# Patient Record
Sex: FEMALE | Race: WHITE
[De-identification: names, ages, dates, MRNs, and addresses within clinical notes are randomized per-mention and may not be internally consistent; named-entity substitution may affect disease eponyms.]

---

## 2018-04-30 ENCOUNTER — HOSPITAL ENCOUNTER (OUTPATIENT)
Dept: HOSPITAL 89 - LAB | Age: 23
End: 2018-04-30
Attending: FAMILY MEDICINE
Payer: COMMERCIAL

## 2018-04-30 ENCOUNTER — HOSPITAL ENCOUNTER (EMERGENCY)
Dept: HOSPITAL 89 - ER | Age: 23
Discharge: HOME | End: 2018-04-30
Payer: COMMERCIAL

## 2018-04-30 VITALS — SYSTOLIC BLOOD PRESSURE: 103 MMHG | DIASTOLIC BLOOD PRESSURE: 75 MMHG

## 2018-04-30 DIAGNOSIS — R10.84: ICD-10-CM

## 2018-04-30 DIAGNOSIS — R11.10: ICD-10-CM

## 2018-04-30 DIAGNOSIS — R19.7: Primary | ICD-10-CM

## 2018-04-30 LAB
INR PPP: 0.97
PLATELET COUNT, AUTOMATED: 294 K/UL (ref 150–450)

## 2018-04-30 PROCEDURE — 82374 ASSAY BLOOD CARBON DIOXIDE: CPT

## 2018-04-30 PROCEDURE — 87045 FECES CULTURE AEROBIC BACT: CPT

## 2018-04-30 PROCEDURE — 99284 EMERGENCY DEPT VISIT MOD MDM: CPT

## 2018-04-30 PROCEDURE — 87177 OVA AND PARASITES SMEARS: CPT

## 2018-04-30 PROCEDURE — 96361 HYDRATE IV INFUSION ADD-ON: CPT

## 2018-04-30 PROCEDURE — 85651 RBC SED RATE NONAUTOMATED: CPT

## 2018-04-30 PROCEDURE — 84075 ASSAY ALKALINE PHOSPHATASE: CPT

## 2018-04-30 PROCEDURE — 82040 ASSAY OF SERUM ALBUMIN: CPT

## 2018-04-30 PROCEDURE — 74177 CT ABD & PELVIS W/CONTRAST: CPT

## 2018-04-30 PROCEDURE — 84132 ASSAY OF SERUM POTASSIUM: CPT

## 2018-04-30 PROCEDURE — 82310 ASSAY OF CALCIUM: CPT

## 2018-04-30 PROCEDURE — 86140 C-REACTIVE PROTEIN: CPT

## 2018-04-30 PROCEDURE — 84295 ASSAY OF SERUM SODIUM: CPT

## 2018-04-30 PROCEDURE — 82435 ASSAY OF BLOOD CHLORIDE: CPT

## 2018-04-30 PROCEDURE — 85730 THROMBOPLASTIN TIME PARTIAL: CPT

## 2018-04-30 PROCEDURE — 85025 COMPLETE CBC W/AUTO DIFF WBC: CPT

## 2018-04-30 PROCEDURE — 83690 ASSAY OF LIPASE: CPT

## 2018-04-30 PROCEDURE — 85610 PROTHROMBIN TIME: CPT

## 2018-04-30 PROCEDURE — 82565 ASSAY OF CREATININE: CPT

## 2018-04-30 PROCEDURE — 82247 BILIRUBIN TOTAL: CPT

## 2018-04-30 PROCEDURE — 96375 TX/PRO/DX INJ NEW DRUG ADDON: CPT

## 2018-04-30 PROCEDURE — 87449 NOS EACH ORGANISM AG IA: CPT

## 2018-04-30 PROCEDURE — 83630 LACTOFERRIN FECAL (QUAL): CPT

## 2018-04-30 PROCEDURE — 84703 CHORIONIC GONADOTROPIN ASSAY: CPT

## 2018-04-30 PROCEDURE — 84460 ALANINE AMINO (ALT) (SGPT): CPT

## 2018-04-30 PROCEDURE — 84520 ASSAY OF UREA NITROGEN: CPT

## 2018-04-30 PROCEDURE — 96376 TX/PRO/DX INJ SAME DRUG ADON: CPT

## 2018-04-30 PROCEDURE — 82150 ASSAY OF AMYLASE: CPT

## 2018-04-30 PROCEDURE — 81001 URINALYSIS AUTO W/SCOPE: CPT

## 2018-04-30 PROCEDURE — 96374 THER/PROPH/DIAG INJ IV PUSH: CPT

## 2018-04-30 PROCEDURE — 84155 ASSAY OF PROTEIN SERUM: CPT

## 2018-04-30 PROCEDURE — 82947 ASSAY GLUCOSE BLOOD QUANT: CPT

## 2018-04-30 PROCEDURE — 84450 TRANSFERASE (AST) (SGOT): CPT

## 2018-04-30 PROCEDURE — 87324 CLOSTRIDIUM AG IA: CPT

## 2018-04-30 NOTE — RADIOLOGY IMAGING REPORT
FACILITY: [facility] 

 

PATIENT NAME: Catalina Lyons

: 1995

MR: [id number]

V: [id number]

EXAM DATE: 

ORDERING PHYSICIAN: CHARLIE SALAZAR

TECHNOLOGIST: 

 

Location: [facility]

Patient: Catalina Lyons

: 1995

MRN: [id number]

Visit/Account:[id number]

Date of Sevice:  2018

 

ACCESSION #: [id number]

 

Computed tomograpy abdomen and pelvis with IV contrast

 

Indication: Abdominal pain. Leukocytosis.

 

Comparison:   None available. .

 

Technique:   Transaxial computed tomography images were obtained through the abdomen and pelvis follo
wing the injection of nonionic iodinated intravenous contrast. Reformatted coronal and sagittal image
s were also obtained.

 

One of the following dose optimization techniques was utilized in the performance of this exam: Autom
ated exposure control; adjustment of the mA and/or kV according to the patient's size; or use of an i
terative  reconstruction technique.  Specific details can be referenced in the facility's radiology C
T exam operational policy.

 

Contrast:   75 ml of Isovue-370  IV contrast.

 

Findings:

Lower lung fields: Limited views lower lung field are unremarkable.

 

Liver: There is mild fatty liver. No focal parenchymal abnormality is seen.

Biliary: There has been previous cholecystectomy. No intrahepatic or extrahepatic biliary dilatation.


Pancreas: Normal appearance.

Spleen: Normal appearance.

Adrenal glands: Unremarkable.

Kidneys / retroperitoneum: No evidence of nephrolithiasis or hydronephrosis

 

 

Bowel / peritoneum / mesenteries: The majority of the colon is filled with liquid stool. No definitiv
e wall thickening or pericolonic inflammation. Correlate for diarrhea. Appendix not clearly seen. No 
evidence to suggest acute appendicitis. The mid and distal small bowel loops are normal in caliber an
d majority are fluid-filled. This could be related to the liquid stool in the colon. No air-fluid lev
els. No free air. There is trace free pelvic fluid to the right of midline.

 

 

Lymph node assessment: No pathologic adenopathy identified.

 

Pelvic  structures: Appear unremarkable.

 

 

Vessels: No significant atherosclerotic calcifications seen throughout a nonaneurysmal abdominal aort
a and branches.

 

Musculoskeletal / Body wall: No acute or aggressive osseous abnormality.

 

 

 

IMPRESSION:

1. Liquid stool within the majority of the colon without definitive wall thickening or pericolonic in
flammation. Correlate for diarrhea. This may be related to fluid-filled small bowel loops which are n
ormal in caliber.

2. Appendix not clearly identified. No evidence to suggest acute appendicitis.

3. Trace free fluid within the low pelvis.

4. Mild fatty liver.

 

 

Report Dictated By: Albert Carolina at 2018 7:04 AM

 

Report E-Signed By: Albert Carolina  at 2018 7:13 AM

 

WSN:[id number]

## 2018-04-30 NOTE — ER REPORT
History and Physical


Time Seen By MD:  04:48


 (CHARLIE FONTENOT DO)


HPI/ROS


CHIEF COMPLAINT: Vomiting and diarrhea





HISTORY OF PRESENT ILLNESS: 22-year-old female presents with vomiting and 

diarrhea dysuria since 8 PM last night.  She was planning to come in earlier, 

but she was unable to get off of the toilet and come in.  She's been having 

bloody diarrhea.  She had a sinus infection several weeks ago and was 

prescribed antibiotics.  She denies consumption of bad food or exposure to ill 

contacts.  Patient reports diarrhea for several weeks, which came much worse 

with bloody diarrhea yesterday.  Patient notes no fever or chills.  She denies 

dysuria.  Her last menstrual.  It was 5 weeks ago.  She is on birth control.  

Patient reports strong family history of ulcerative colitis and Crohn's 

disease.  Patient is status post appendectomy and cholecystectomy





REVIEW OF SYSTEMS:


Respiratory: No cough, no dyspnea.


Cardiovascular: No chest pain, no palpitations.


Gastrointestinal: As above


Musculoskeletal: No back pain.


 (CHARLIE FONTENOT DO)


Allergies:  


Coded Allergies:  


     albuterol (Verified  Allergy, Intermediate, HIVES/VOMITING, 4/30/18)


     amoxicillin (Verified  Allergy, Intermediate, HIVES/VOMITING, 4/30/18)


     clavulanic acid (Verified  Allergy, Intermediate, HIVES/VOMITING, 4/30/18)


Home Meds


Active Scripts


Hydrocodone Bit/Acetaminophen (HYDROCODON-ACETAMINOPHEN 5-325) 1 Each Tablet, 1 

EACH PO Q4H Y for PAIN, #10 TAB 0 Refills


   Prov:FRANKLIN URBINA MD         4/30/18


Ondansetron (ZOFRAN ODT) 4 Mg Tab.rapdis, 4 MG PO Q6H Y for NAUSEA/VOMITING, #

20 TAB.SOL 0 Refills


   Prov:FRANKLIN URBINA MD         4/30/18


Fluconazole (DIFLUCAN) 150 Mg Tablet, 150 MG PO QDAY for 1 Day, #1 TAB 0 Refills


   Prov:FRANKLIN URBINA MD         4/30/18


Prednisone (PREDNISONE) 20 Mg Tablet, 40 MG PO QDAY for 5 Days, #10 TAB 0 

Refills


   Prov:FRANKLIN URBINA MD         4/30/18


Metronidazole (FLAGYL) 500 Mg Tablet, 500 MG PO TID, #30 TAB 0 Refills


   Prov:FRANKLIN URBINA MD         4/30/18


Ciprofloxacin Hcl (CIPROFLOXACIN HCL) 500 Mg Tablet, 500 MG PO Q12H, #20 TAB


   Prov:FRANKLIN URBINA MD         4/30/18


Reported Medications


[Birth Control]   No Conflict Check, 1 TAB PO QDAY


   4/30/18


Reviewed Nurses Notes:  Yes


Old Medical Records Reviewed:  Yes


 (MARTINCHARLIE LARY SANDERS)


Constitutional





Vital Sign - Last 24 Hours








 4/30/18 4/30/18 4/30/18 4/30/18





 04:49 04:59 05:08 05:14


 


Temp 98.1   


 


Pulse 137 117  112


 


Resp 24   


 


B/P (MAP) 123/100  124/81 (95) 


 


Pulse Ox 97 95  99


 


    





 4/30/18 4/30/18 4/30/18 4/30/18





 05:30 05:34 05:39 05:54


 


Pulse  107 104 112


 


B/P (MAP) 124/80 (95)   


 


Pulse Ox  100 99 100





 4/30/18 4/30/18 4/30/18 4/30/18





 06:00 06:09 06:14 06:29


 


Pulse  100 114 108


 


B/P (MAP) 114/79 (91)   


 


Pulse Ox  100 100 100





 4/30/18 4/30/18 4/30/18 4/30/18





 06:30 07:00 07:30 08:00


 


Pulse  101 101 


 


B/P (MAP) 116/83 (94) 108/68 (81) 109/72 (84) 113/74 (87)


 


Pulse Ox   93 96





 4/30/18   





 08:14   


 


B/P (MAP) 103/75 (84)   





 (FRANKLIN URBINA MD)


Physical Exam


General Appearance: The patient is alert, has no immediate need for airway 

protection and no current signs of toxicity..  Vital signs stable, tachycardia, 

slightly pale appearing, skin warm and dry


HEENT: Pupils equal and round no injection.  TMs normal, oropharynx without 

redness or exudate, mucous.  Membranes are moist


Respiratory: Chest is non tender, lungs are clear to auscultation.


Cardiac: regular rate and rhythm


Gastrointestinal: Abdomen is soft, mild diffuse tenderness, no masses, bowel 

sounds normal.


Musculoskeletal:  Neck: Neck is supple and non tender.


Extremities have full range of motion and are non tender.


Skin: No rashes or lesions.





DIFFERENTIAL DIAGNOSIS: After history and physical exam differential diagnosis 

was considered for abdominal pain including but not limited to appendicitis, 

cholecystitis, gastritis, gastroenteritis, viral syndrome, food poisoning, 

Clostridium difficile, and urinary tract infection.


 (CHARLIE FONTENOT DO)





Medical Decision Making


Data Points


Result Diagram:  


4/30/18 0454                                                                   

             4/30/18 0454





Laboratory





Hematology








Test


  4/30/18


04:54 4/30/18


07:21


 


Red Blood Count


  5.87 M/uL


(4.17-5.56) 


 


 


Mean Corpuscular Volume


  90.8 fL


(80.0-96.0) 


 


 


Mean Corpuscular Hemoglobin


  30.7 pg


(26.0-33.0) 


 


 


Mean Corpuscular Hemoglobin


Concent 33.8 g/dL


(32.0-36.0) 


 


 


Red Cell Distribution Width


  12.9 %


(11.5-14.5) 


 


 


Mean Platelet Volume


  8.3 fL


(7.2-11.1) 


 


 


Neutrophils (%) (Auto)  % (39.4-72.5)  


 


Lymphocytes (%) (Auto)  % (17.6-49.6)  


 


Monocytes (%) (Auto)  % (4.1-12.4)  


 


Eosinophils (%) (Auto)  % (0.4-6.7)  


 


Basophils (%) (Auto)  % (0.3-1.4)  


 


Nucleated RBC Relative Count


(auto)  /100WBC 


  


 


 


Neutrophils # (Auto)


  K/uL


(2.0-7.4) 


 


 


Lymphocytes # (Auto)


  K/uL


(1.3-3.6) 


 


 


Monocytes # (Auto)


  K/uL


(0.3-1.0) 


 


 


Eosinophils # (Auto)


  K/uL


(0.0-0.5) 


 


 


Basophils # (Auto)


  K/uL


(0.0-0.1) 


 


 


Nucleated RBC Absolute Count


(auto)  K/uL 


  


 


 


Neutrophils % (Manual)


  68 %


(39.4-72.5) 


 


 


Band Neutrophils % 23 %  


 


Lymphocytes % (Manual)


  4 %


(17.6-49.6) 


 


 


Monocytes % (Manual) 5 % (4.1-12.4)  


 


Eosinophils % (Manual) 0 % (0.4-6.7)  


 


Basophils % (Manual) 0 % (0.3-1.4)  


 


Peripheral Blood Smear Yes Y/N  


 


Erythrocyte Sedimentation Rate


  1 mm/HOUR


(0-20) 


 


 


Prothrombin Time


  12.9 seconds


(12.0-14.4) 


 


 


Prothromb Time International


Ratio 0.97 


  


 


 


Activated Partial


Thromboplast Time 23 seconds


(23-35) 


 


 


Sodium Level


  139 mmol/L


(137-145) 


 


 


Potassium Level


  4.2 mmol/L


(3.5-5.0) 


 


 


Chloride Level


  102 mmol/L


() 


 


 


Carbon Dioxide Level


  19 mmol/L


(22-31) 


 


 


Blood Urea Nitrogen


  13 mg/dl


(7-18) 


 


 


Creatinine


  0.80 mg/dl


(0.52-1.04) 


 


 


Glomerular Filtration Rate


Calc > 60.0 


  


 


 


Random Glucose


  172 mg/dl


() 


 


 


Calcium Level


  10.0 mg/dl


(8.4-10.2) 


 


 


Total Bilirubin


  1.0 mg/dl


(0.2-1.3) 


 


 


Aspartate Amino Transf


(AST/SGOT) 28 U/L (0-35) 


  


 


 


Alanine Aminotransferase


(ALT/SGPT) 20 U/L (0-56) 


  


 


 


Alkaline Phosphatase 75 U/L (0-126)  


 


C-Reactive Protein


  1.3 mg/dl


(<1.0) 


 


 


Total Protein


  7.8 gm/dl


(6.3-8.2) 


 


 


Albumin


  4.4 g/dl


(3.5-5.0) 


 


 


Amylase Level 77 U/L (0-110)  


 


Lipase


  77 U/L


() 


 


 


Human Chorionic Gonadotropin,


Qual Negative


(NEGATIVE) 


 


 


Urine Color  Yellow 


 


Urine Clarity  Clear 


 


Urine pH


  


  5.0 pH


(4.8-9.5)


 


Urine Specific Gravity  1.047 


 


Urine Protein


  


  Negative mg/dL


(NEGATIVE)


 


Urine Glucose (UA)


  


  Negative mg/dL


(NEGATIVE)


 


Urine Ketones


  


  Trace mg/dL


(NEGATIVE)


 


Urine Blood


  


  Negative


(NEGATIVE)


 


Urine Nitrite


  


  Negative


(NEGATIVE)


 


Urine Bilirubin


  


  Negative


(NEGATIVE)


 


Urine Urobilinogen


  


  Negative mg/dL


(0.2-1.9)


 


Urine Leukocyte Esterase


  


  Negative


(NEGATIVE)


 


Urine RBC


  


  <1 /HPF


(0-2/HPF)


 


Urine WBC


  


  None /HPF


(0-5/HPF)


 


Urine Squamous Epithelial


Cells 


  Many /LPF


(</=FEW)


 


Urine Bacteria


  


  Negative /HPF


(NONE-FEW)


 


Urine Mucus


  


  None /HPF


(NONE-FEW)








Chemistry








Test


  4/30/18


04:54 4/30/18


07:21


 


White Blood Count


  20.4 k/uL


(4.5-11.0) 


 


 


Red Blood Count


  5.87 M/uL


(4.17-5.56) 


 


 


Hemoglobin


  18.0 g/dL


(12.0-16.0) 


 


 


Hematocrit


  53.3 %


(34.0-47.0) 


 


 


Mean Corpuscular Volume


  90.8 fL


(80.0-96.0) 


 


 


Mean Corpuscular Hemoglobin


  30.7 pg


(26.0-33.0) 


 


 


Mean Corpuscular Hemoglobin


Concent 33.8 g/dL


(32.0-36.0) 


 


 


Red Cell Distribution Width


  12.9 %


(11.5-14.5) 


 


 


Platelet Count


  294 K/uL


(150-450) 


 


 


Mean Platelet Volume


  8.3 fL


(7.2-11.1) 


 


 


Neutrophils (%) (Auto)  % (39.4-72.5)  


 


Lymphocytes (%) (Auto)  % (17.6-49.6)  


 


Monocytes (%) (Auto)  % (4.1-12.4)  


 


Eosinophils (%) (Auto)  % (0.4-6.7)  


 


Basophils (%) (Auto)  % (0.3-1.4)  


 


Nucleated RBC Relative Count


(auto)  /100WBC 


  


 


 


Neutrophils # (Auto)


  K/uL


(2.0-7.4) 


 


 


Lymphocytes # (Auto)


  K/uL


(1.3-3.6) 


 


 


Monocytes # (Auto)


  K/uL


(0.3-1.0) 


 


 


Eosinophils # (Auto)


  K/uL


(0.0-0.5) 


 


 


Basophils # (Auto)


  K/uL


(0.0-0.1) 


 


 


Nucleated RBC Absolute Count


(auto)  K/uL 


  


 


 


Neutrophils % (Manual)


  68 %


(39.4-72.5) 


 


 


Band Neutrophils % 23 %  


 


Lymphocytes % (Manual)


  4 %


(17.6-49.6) 


 


 


Monocytes % (Manual) 5 % (4.1-12.4)  


 


Eosinophils % (Manual) 0 % (0.4-6.7)  


 


Basophils % (Manual) 0 % (0.3-1.4)  


 


Peripheral Blood Smear Yes Y/N  


 


Erythrocyte Sedimentation Rate


  1 mm/HOUR


(0-20) 


 


 


Prothrombin Time


  12.9 seconds


(12.0-14.4) 


 


 


Prothromb Time International


Ratio 0.97 


  


 


 


Activated Partial


Thromboplast Time 23 seconds


(23-35) 


 


 


Glomerular Filtration Rate


Calc > 60.0 


  


 


 


Calcium Level


  10.0 mg/dl


(8.4-10.2) 


 


 


Total Bilirubin


  1.0 mg/dl


(0.2-1.3) 


 


 


Aspartate Amino Transf


(AST/SGOT) 28 U/L (0-35) 


  


 


 


Alanine Aminotransferase


(ALT/SGPT) 20 U/L (0-56) 


  


 


 


Alkaline Phosphatase 75 U/L (0-126)  


 


C-Reactive Protein


  1.3 mg/dl


(<1.0) 


 


 


Total Protein


  7.8 gm/dl


(6.3-8.2) 


 


 


Albumin


  4.4 g/dl


(3.5-5.0) 


 


 


Amylase Level 77 U/L (0-110)  


 


Lipase


  77 U/L


() 


 


 


Human Chorionic Gonadotropin,


Qual Negative


(NEGATIVE) 


 


 


Urine Color  Yellow 


 


Urine Clarity  Clear 


 


Urine pH


  


  5.0 pH


(4.8-9.5)


 


Urine Specific Gravity  1.047 


 


Urine Protein


  


  Negative mg/dL


(NEGATIVE)


 


Urine Glucose (UA)


  


  Negative mg/dL


(NEGATIVE)


 


Urine Ketones


  


  Trace mg/dL


(NEGATIVE)


 


Urine Blood


  


  Negative


(NEGATIVE)


 


Urine Nitrite


  


  Negative


(NEGATIVE)


 


Urine Bilirubin


  


  Negative


(NEGATIVE)


 


Urine Urobilinogen


  


  Negative mg/dL


(0.2-1.9)


 


Urine Leukocyte Esterase


  


  Negative


(NEGATIVE)


 


Urine RBC


  


  <1 /HPF


(0-2/HPF)


 


Urine WBC


  


  None /HPF


(0-5/HPF)


 


Urine Squamous Epithelial


Cells 


  Many /LPF


(</=FEW)


 


Urine Bacteria


  


  Negative /HPF


(NONE-FEW)


 


Urine Mucus


  


  None /HPF


(NONE-FEW)








Coagulation








Test


  4/30/18


04:54


 


Prothrombin Time 12.9 seconds 


 


Prothromb Time International


Ratio 0.97 


 


 


Activated Partial


Thromboplast Time 23 seconds 


 








Urinalysis








Test


  4/30/18


07:21


 


Urine Color Yellow 


 


Urine Clarity Clear 


 


Urine pH


  5.0 pH


(4.8-9.5)


 


Urine Specific Gravity 1.047 


 


Urine Protein


  Negative mg/dL


(NEGATIVE)


 


Urine Glucose (UA)


  Negative mg/dL


(NEGATIVE)


 


Urine Ketones


  Trace mg/dL


(NEGATIVE)


 


Urine Blood


  Negative


(NEGATIVE)


 


Urine Nitrite


  Negative


(NEGATIVE)


 


Urine Bilirubin


  Negative


(NEGATIVE)


 


Urine Urobilinogen


  Negative mg/dL


(0.2-1.9)


 


Urine Leukocyte Esterase


  Negative


(NEGATIVE)


 


Urine RBC


  <1 /HPF


(0-2/HPF)


 


Urine WBC


  None /HPF


(0-5/HPF)


 


Urine Squamous Epithelial


Cells Many /LPF


(</=FEW)


 


Urine Bacteria


  Negative /HPF


(NONE-FEW)


 


Urine Mucus


  None /HPF


(NONE-FEW)





 (FRANKLIN URBINA MD)





EKG/Imaging


Imaging


Computed tomograpy abdomen and pelvis with IV contrast


 


Indication: Abdominal pain. Leukocytosis.


 


Comparison:   None available. .


 


Technique:   Transaxial computed tomography images were obtained through the 

abdomen and pelvis following the injection of nonionic iodinated intravenous 

contrast. Reformatted coronal and sagittal images were also obtained.


 


One of the following dose optimization techniques was utilized in the 

performance of this exam: Automated exposure control; adjustment of the mA and/

or kV according to the patient's size; or use of an iterative  reconstruction 

technique.  Specific details can be referenced in the facility's radiology CT 

exam operational policy.


 


Contrast:   75 ml of Isovue-370  IV contrast.


 


Findings:


Lower lung fields: Limited views lower lung field are unremarkable.


 


Liver: There is mild fatty liver. No focal parenchymal abnormality is seen.


Biliary: There has been previous cholecystectomy. No intrahepatic or 

extrahepatic biliary dilatation.


Pancreas: Normal appearance.


Spleen: Normal appearance.


Adrenal glands: Unremarkable.


Kidneys / retroperitoneum: No evidence of nephrolithiasis or hydronephrosis


 


Bowel / peritoneum / mesenteries: The majority of the colon is filled with 

liquid stool. No definitive wall thickening or pericolonic inflammation. 

Correlate for diarrhea. Appendix not clearly seen. No evidence to suggest acute 

appendicitis. The mid and distal small bowel loops are normal in caliber and 

majority are fluid-filled. This could be related to the liquid stool in the 

colon. No air-fluid levels. No free air. There is trace free pelvic fluid to 

the right of midline.


 


Lymph node assessment: No pathologic adenopathy identified.


 


Pelvic  structures: Appear unremarkable.


 


Vessels: No significant atherosclerotic calcifications seen throughout a 

nonaneurysmal abdominal aorta and branches.


 


Musculoskeletal / Body wall: No acute or aggressive osseous abnormality.


 


IMPRESSION:


1. Liquid stool within the majority of the colon without definitive wall 

thickening or pericolonic inflammation. Correlate for diarrhea. This may be 

related to fluid-filled small bowel loops which are normal in caliber.


2. Appendix not clearly identified. No evidence to suggest acute appendicitis.


3. Trace free fluid within the low pelvis.


4. Mild fatty liver.


 


Report Dictated By: Albert Carolina at 4/30/2018 7:04 AM


 (FRANKLIN URBINA MD)


ED Course/Re-evaluation


Clinical Indication for ER IV:  Hydration, IV Access


ED Course


Care was turned over to Dr. Urbina at shift change with diagnostic CT pending.  

Patient with elevated white blood cell count of 20,000.


 (CHRALIE FONTENOT DO)


Clinical Indication for ER IV:  Hydration, IV Access


ED Course


Discussed this patient with Dr. Fontenot at shift change and assumed care. 22-year

-old female with vomiting and diarrhea, diarrhea for several weeks with bloody 

diarrhea during the night, strong family history of ulcerative colitis and Crohn

's disease, recent antibiotic use. Labs are showing an elevated white blood 

cell count with left shift and bands. CT scan showed fluid filled loops of 

intestines, but no wall thickening or other acute pathology. I reviewed this 

with the patient. She was able to tolerate oral ice chips. She did have some 

increase in pain and another dose of Fentanyl was given. Discussed that the 

findings could indicate a new diagnosis of inflammatory bowel disease and we 

discussed the need for follow-up. We will start Prednisone 20mg tablets, 2 

tablets once a day for 5 days. She could also have an infectious diarrhea and 

we will start Cipro and Flagyl. She requested Diflucan for yeast infections 

associated with antibiotic use. Provided prescriptions for Zofran and Lortab as 

well. She can follow-up with Dr. Ullrich or with Dr Macias.


Decision to Disposition Date:  Apr 30, 2018


Decision to Disposition Time:  08:07


 (FRANKLIN URBINA MD)





Depart


Departure


Latest Vital Signs





Vital Signs








  Date Time  Temp Pulse Resp B/P (MAP) Pulse Ox O2 Delivery O2 Flow Rate FiO2


 


4/30/18 08:14    103/75 (84)    


 


4/30/18 08:00     96   


 


4/30/18 07:30  101      


 


4/30/18 04:49 98.1  24     





 (FRANKLIN URBINA MD)


Impression:  


 Primary Impression:  


 Diarrhea


 Additional Impression:  


 Abdominal pain


Condition:  Improved


Disposition:  HOME OR SELF-CARE


Referrals:  


KATZ,KENT MD ULLRICH,JOHN A MD


New Scripts


Hydrocodone Bit/Acetaminophen (HYDROCODON-ACETAMINOPHEN 5-325) 1 Each Tablet


1 EACH PO Q4H Y for PAIN, #10 TAB 0 Refills


   Prov: FRANKLIN URBINA MD         4/30/18 


Ondansetron (ZOFRAN ODT) 4 Mg Tab.rapdis


4 MG PO Q6H Y for NAUSEA/VOMITING, #20 TAB.SOL 0 Refills


   Prov: FRANKLIN URBINA MD         4/30/18 


Fluconazole (DIFLUCAN) 150 Mg Tablet


150 MG PO QDAY for 1 Day, #1 TAB 0 Refills


   Prov: FRANKLIN URBINA MD         4/30/18 


Prednisone (PREDNISONE) 20 Mg Tablet


40 MG PO QDAY for 5 Days, #10 TAB 0 Refills


   Prov: FRANKLIN URBINA MD         4/30/18 


Metronidazole (FLAGYL) 500 Mg Tablet


500 MG PO TID, #30 TAB 0 Refills


   Prov: FRANKLIN URBINA MD         4/30/18 


Ciprofloxacin Hcl (CIPROFLOXACIN HCL) 500 Mg Tablet


500 MG PO Q12H, #20 TAB


   Prov: FRANKLIN URBINA MD         4/30/18


Patient Instructions:  Acute Diarrhea (ED)





Additional Instructions:  


We did not find an exact cause for your diarrhea yet.


We are concerned that it could represent either an infection or perhaps and 

inflammatory cause such as Crohn's Disease or Ulcerative Colitis.


The next step will be follow-up with either a Gastroenterologist or a General 

Surgeon who can perform further evaluation, likely with colonoscopy.


We would like to try to have you collect some stool to run some stool studies.


We are going to start you on Prednisone 20mg tablets, 2 tablets once a day for 

5 days.


Take the antibiotic Cipro 500mg twice a day for 10 days.


Take the antibiotic Metronidazole 400mg three times a day for 10 days.


We have provided a prescription for Diflucan 150mg to take to prevent a yeast 

infection.





For nausea, take Zofran 4mg, one every 6 hours as needed for nausea.


For pain, take Lortab 5/325, one every 4 hours as needed for pain.





Problem Qualifiers








 Primary Impression:  


 Diarrhea


 Diarrhea type:  unspecified type  Qualified Codes:  R19.7 - Diarrhea, 

unspecified


 Additional Impression:  


 Abdominal pain


 Abdominal location:  generalized  Qualified Codes:  R10.84 - Generalized 

abdominal pain








CHARLIE FONTENOT DO Apr 30, 2018 04:49


FRANKLIN URBINA MD Apr 30, 2018 07:07

## 2018-05-08 ENCOUNTER — HOSPITAL ENCOUNTER (OUTPATIENT)
Dept: HOSPITAL 89 - OR | Age: 23
End: 2018-05-08
Attending: SURGERY
Payer: COMMERCIAL

## 2018-05-08 VITALS — SYSTOLIC BLOOD PRESSURE: 102 MMHG | DIASTOLIC BLOOD PRESSURE: 79 MMHG

## 2018-05-08 VITALS — HEIGHT: 64 IN | BODY MASS INDEX: 23.56 KG/M2 | WEIGHT: 138 LBS

## 2018-05-08 VITALS — DIASTOLIC BLOOD PRESSURE: 66 MMHG | SYSTOLIC BLOOD PRESSURE: 102 MMHG

## 2018-05-08 VITALS — DIASTOLIC BLOOD PRESSURE: 84 MMHG | SYSTOLIC BLOOD PRESSURE: 122 MMHG

## 2018-05-08 VITALS — SYSTOLIC BLOOD PRESSURE: 109 MMHG | DIASTOLIC BLOOD PRESSURE: 73 MMHG

## 2018-05-08 DIAGNOSIS — K92.0: ICD-10-CM

## 2018-05-08 DIAGNOSIS — K92.1: Primary | ICD-10-CM

## 2018-05-08 PROCEDURE — 88305 TISSUE EXAM BY PATHOLOGIST: CPT

## 2018-05-08 PROCEDURE — 00811 ANES LWR INTST NDSC NOS: CPT

## 2018-05-08 PROCEDURE — 45380 COLONOSCOPY AND BIOPSY: CPT

## 2018-05-08 PROCEDURE — 43235 EGD DIAGNOSTIC BRUSH WASH: CPT

## 2018-05-08 NOTE — POST OPERATIVE PROGRESS NOTE
Post Operative Progress Note


Date:  May 8, 2018


Time:  11:35


Surgeon:  


tracey


Anesthesia:  


dr flores


Pre-Op Diagnosis:  


hematemesis and hematachezia


Post-Op Diagnosis:  


normal egd and colonoscopy


Procedure(s):  


egd and colonoscopy with biopsy











HIRAL DAI MD May 8, 2018 06:57

## 2018-05-08 NOTE — OPERATIVE REPORT 1
EVENT DATE:  May 8, 2018

SURGEON:  Daniel Reyna MD

ANESTHESIOLOGIST:  Edmond An MD

ANESTHESIA:  Sedation.





PREOPERATIVE DIAGNOSIS  

Hematemesis.



POSTOPERATIVE DIAGNOSIS 

Normal-appearing esophagogastroduodenoscopy.



PROCEDURE PERFORMED 

Esophagogastroduodenoscopy.



DESCRIPTION OF PROCEDURE 

Patient was placed in left lateral decubitus position and given intravenous 
sedation.  Flexible gastroscope was inserted and advanced.  The esophagus 
appeared to be normal.  The GE junction was right at 40 cm, distinct.  No 
ulceration, inflammation.  No narrowing.  Stomach was empty.  Passed through 
the pylorus and second and third portions of the duodenum, which were normal.  
Duodenal bulb was normal.  Pylorus was normal.  Antrum was normal.  Body of 
stomach was normal.  Scope was retroflexed.  There were no fundic lesions.  No 
hiatal hernia was noted.  Scope was slowly withdrawn.  No abnormalities were 
noted of the esophagus.     
MTDD

## 2018-05-08 NOTE — SHORT(OUTPT) DISCHARGE SUMMARY
Discharge Summary


Reason for Hosp/Final Diag:  


(1) Hematochezia


Hospital Course & Plan:  normal colonoscopy, random biopsies taken





(2) Hematemesis


Hospital Course & Plan:  normal egd





Departure


Discharge to:  Home





Discharge Instructions


Home Meds


Active Scripts


Ondansetron (ZOFRAN ODT) 4 Mg Tab.rapdis, 4 MG PO Q6H Y for NAUSEA/VOMITING, #

20 TAB.SOL 0 Refills


   Prov:OTF ROME MD         4/30/18


Fluconazole (DIFLUCAN) 150 Mg Tablet, 150 MG PO QDAY for 1 Day, #1 TAB 0 Refills


   Prov:OTF ROME MD         4/30/18


Metronidazole (FLAGYL) 500 Mg Tablet, 500 MG PO TID, #30 TAB 0 Refills


   Prov:OTF ROME MD         4/30/18


Ciprofloxacin Hcl (CIPROFLOXACIN HCL) 500 Mg Tablet, 500 MG PO Q12H, #20 TAB


   Prov:OTF ROME MD         4/30/18


Reported Medications


[Birth Control]   No Conflict Check, 1 TAB PO QDAY


   4/30/18


Discontinued Scripts


Hydrocodone Bit/Acetaminophen (HYDROCODON-ACETAMINOPHEN 5-325) 1 Each Tablet, 1 

EACH PO Q4H Y for PAIN, #10 TAB 0 Refills


   Prov:OTF ROME MD         4/30/18


Prednisone (PREDNISONE) 20 Mg Tablet, 40 MG PO QDAY for 5 Days, #10 TAB 0 

Refills


   Prov:OTF ROME MD         4/30/18


Diet:  Regular


Activity:  As Tolerated











HIRAL DAI MD May 8, 2018 06:58

## 2018-05-08 NOTE — OPERATIVE REPORT 1
EVENT DATE:  May 8, 2018

SURGEON:  Daniel Reyna MD

ANESTHESIOLOGIST:  Edmond An MD

ANESTHESIA:  Sedation.





PREOPERATIVE DIAGNOSIS  

Bloody diarrhea.



POSTOPERATIVE DIAGNOSIS 

Normal-appearing colonoscopic examination.



PROCEDURE PERFORMED 

Colonoscopy.



DESCRIPTION OF PROCEDURE 

Patient was placed in the left lateral decubitus position and given intravenous 
sedation.  Perianal examination was unremarkable.  No fissures or fistulas were 
noted.  No hemorrhoids.  Digital exam was unremarkable.  Flexible colonoscope 
was inserted and advanced to the cecum.  Ileocecal valve and base of the cecum 
were identified, and they appeared to be normal.  She had an excellent bowel 
prep.  We spent several minutes trying to cannulate the terminal ileum, but I 
was unable to do so, so eventually we had to abandon that.  We then took 
biopsies of the right colon which appeared to be normal.  Transverse colon was 
normal.  We took biopsies in the left colon which appeared to be normal.  We 
took biopsies in the rectum, and that appeared to be normal.  Scope was 
retroflexed.  That appeared to be normal.  
[id number]

## 2018-07-10 ENCOUNTER — HOSPITAL ENCOUNTER (OUTPATIENT)
Dept: HOSPITAL 89 - ZZSENDIN | Age: 23
End: 2018-07-10
Attending: PHYSICIAN ASSISTANT
Payer: COMMERCIAL

## 2018-07-10 DIAGNOSIS — R10.84: Primary | ICD-10-CM

## 2018-07-10 PROCEDURE — 82150 ASSAY OF AMYLASE: CPT

## 2018-07-10 PROCEDURE — 85651 RBC SED RATE NONAUTOMATED: CPT

## 2018-12-07 ENCOUNTER — HOSPITAL ENCOUNTER (OUTPATIENT)
Dept: HOSPITAL 89 - OR | Age: 23
Discharge: HOME | End: 2018-12-07
Attending: OBSTETRICS & GYNECOLOGY
Payer: COMMERCIAL

## 2018-12-07 VITALS — DIASTOLIC BLOOD PRESSURE: 77 MMHG | SYSTOLIC BLOOD PRESSURE: 120 MMHG

## 2018-12-07 VITALS — SYSTOLIC BLOOD PRESSURE: 107 MMHG | DIASTOLIC BLOOD PRESSURE: 71 MMHG

## 2018-12-07 VITALS — SYSTOLIC BLOOD PRESSURE: 106 MMHG | DIASTOLIC BLOOD PRESSURE: 66 MMHG

## 2018-12-07 VITALS — WEIGHT: 150 LBS | HEIGHT: 64 IN | BODY MASS INDEX: 25.61 KG/M2

## 2018-12-07 VITALS — DIASTOLIC BLOOD PRESSURE: 70 MMHG | SYSTOLIC BLOOD PRESSURE: 102 MMHG

## 2018-12-07 VITALS — SYSTOLIC BLOOD PRESSURE: 104 MMHG | DIASTOLIC BLOOD PRESSURE: 61 MMHG

## 2018-12-07 VITALS — SYSTOLIC BLOOD PRESSURE: 102 MMHG | DIASTOLIC BLOOD PRESSURE: 62 MMHG

## 2018-12-07 VITALS — SYSTOLIC BLOOD PRESSURE: 104 MMHG | DIASTOLIC BLOOD PRESSURE: 66 MMHG

## 2018-12-07 VITALS — SYSTOLIC BLOOD PRESSURE: 104 MMHG | DIASTOLIC BLOOD PRESSURE: 74 MMHG

## 2018-12-07 DIAGNOSIS — R10.2: ICD-10-CM

## 2018-12-07 DIAGNOSIS — N80.9: ICD-10-CM

## 2018-12-07 DIAGNOSIS — N94.10: Primary | ICD-10-CM

## 2018-12-07 LAB — PLATELET COUNT, AUTOMATED: 324 K/UL (ref 150–450)

## 2018-12-07 PROCEDURE — 88305 TISSUE EXAM BY PATHOLOGIST: CPT

## 2018-12-07 PROCEDURE — 36415 COLL VENOUS BLD VENIPUNCTURE: CPT

## 2018-12-07 PROCEDURE — 58662 LAPAROSCOPY EXCISE LESIONS: CPT

## 2018-12-07 PROCEDURE — 84703 CHORIONIC GONADOTROPIN ASSAY: CPT

## 2018-12-07 PROCEDURE — 85025 COMPLETE CBC W/AUTO DIFF WBC: CPT

## 2018-12-07 NOTE — OPERATIVE REPORT 1
EVENT DATE: December 7, 2018 

SURGEON: Juancho Sutton MD 

ANESTHESIOLOGIST: Edmond An MD 

ANESTHESIA: General endotracheal. 





PREOPERATIVE DIAGNOSIS  

Dyspareunia and pelvic pain. 



POSTOPERATIVE DIAGNOSIS 

1.  Dyspareunia and pelvic pain. 

2.  Endometriosis.  



PROCEDURE PERFORMED 

Diagnostic laparoscopy with resection of endometriosis and fulguration of 

endometriosis.  



ESTIMATED BLOOD LOSS 

Minimal.  



FLUIDS 

1800 cc. IV Crystalloid. 



FINDINGS

Normal appearing right upper quadrant, right lower quadrant, normal appearing 

ovaries, normal uterus, vesicular type endometriosis present in the left 

uterosacral ligament with a Matthew window as well as the posterior cul-de-sac

with a Matthew window on the right side.  



PROCEDURE IN DETAIL 

The patient was brought to the operating room with a working IV and placed in 

the dorsal supine position.  She was placed under general endotracheal 

anesthesia and moved to the dorsal lithotomy position.  She was then prepped and

draped in the usual sterile fashion.  A weighted speculum was placed in the 

vagina and the cervix was grasped on the anterior lip with a single tooth 

tenaculum.  The uterus was sounded to a depth of 8 cm.  A 6 cm Shani uterine 

manipulator was selected and assembled.  The cervix was dilated to accommodate 

and the manipulator was passed through the cervix into the uterus, bulb inflated

and secured and all other instruments were then removed.  The legs were brought 

back to the supine position.  Her bladder was drained at prep.  Gloves were 

changed.  The umbilicus was infiltrated with 0.2% Naropin.  A 5 mm stab incision

was made.  The Veress needle was passed through this incision while elevating 

the anterior abdominal wall.  The pneumoperitoneum was created to an 

intraabdominal pressure of 20 mmHg.  The Veress needle was then removed.  A 5 mm

bladeless trocar was passed through this incision into the abdomen while 

stabilizing the anterior abdominal wall and under direct visualization, this was

performed without incident.  Two additional 5 mm ports were placed in the 

suprapubic and left lower quadrant locations under a similar technique and 

without incident.  The abdomen and pelvis were surveyed with the above findings 

noted.  Using [place] dissector and laparoscopic scissors, the Matthew window

peritoneum was elevated and stretched away from the side wall, while this area 

was carefully excised using the scissors.  The same procedure was followed on 

the Matthew in the posterior cul-de-sac.  These areas received electrocautery 

and verifying that the ureter or any vital structures were well away from the 

area.  It was monopolar cautery that was used and the posterior cervix and the 

uterus had some vesicular endometriosis in those locations which received some 

electrocautery as well.  The pelvis was then irrigated and suctioned dried.  

Upon completion, the rest of the hemostatic agent was applied to the dissection 

areas.  No visible complications or other issues.  The pneumoperitoneum was 

released.  All instruments were removed from the patient's abdomen.  She was 

awakened from general anesthesia in stable condition. 

MARIAMA

## 2018-12-07 NOTE — SHORT(OUTPT) DISCHARGE SUMMARY
Discharge Summary


Reason for Hosp/Final Diag:  


(1) Dyspareunia, female


(2) Endometriosis, pelvic peritoneum


Departure


Discharge to:  Home, Self Care





Discharge Instructions


Home Meds


Active Scripts


Hydrocodone Bit/Acetaminophen (HYDROCODON-ACETAMINOPHEN 5-325) 1 Each Tablet, 2 


EACH PO Q6H PRN for PAIN, #20 TAB 0 Refills


   Prov:DOUGLAS WALDRON MD         12/7/18


Reported Medications


Scopolamine (Scopolamine) 1 Mg/3 Day Patch.td.3, 1.5 MG TD ONCE


   To be place behind the ear the night prior to procedure.


   12/6/18


Minocycline Hcl (MINOCYCLINE HCL) 100 Mg Capsule, 100 MG PO DAILY, CAPSULE


   11/29/18


Ethinyl Estradiol/Drospirenone (GIANVI 3 MG-0.02 MG TABLET) 1 Each Tablet, 1 


EACH PO DAILY


   11/29/18


Follow up Referrals:  


OB/GYN - In Two Weeks @ [facility] with DOUGLAS WALDRON MD





Diet:  Regular


Activity:  As Tolerated


Copies to:   DOUGLAS WALDRON MD ;











DOUGLAS WALDRON MD              Dec 7, 2018 08:35

## 2018-12-07 NOTE — POST OPERATIVE NOTE
Operative Note - OB/GYN


Operative Day


Date:  Dec 7, 2018


Time:  08:42





Physicians


Surgeon:  


Lesley


Anesthesia:  


GETA





Diagnosis


Pre-Op Diagnosis:  


Deep dyspareunia


Pelvic pain


Post-Op Diagnosis:  


same





Procedure


Findings:  


vesicular endometriosis


Procedure(s):  


L-scope diagnostic


Resection of endometriosis


Fulguration of endometriosis


Specimen Removed:(Maybe N/A):  


pelvic peritoneum from left US ligament and posterior culdesac


Complications:  


none





Fluids


Fluids:  


1800 ml


Estimated Blood Loss:  


minimal





Dictated


Date OP Note Dictated:  Dec 7, 2018


Time OP Note Dictated:  08:44


Copies to:   DOUGLAS WALDRON MD ;











DOUGLAS WALDRON MD              Dec 7, 2018 08:44

## 2019-01-18 ENCOUNTER — HOSPITAL ENCOUNTER (OUTPATIENT)
Dept: HOSPITAL 89 - ZZSENDIN | Age: 24
End: 2019-01-18
Attending: PHYSICIAN ASSISTANT
Payer: COMMERCIAL

## 2019-01-18 DIAGNOSIS — N92.0: Primary | ICD-10-CM

## 2019-01-18 LAB — PLATELET COUNT, AUTOMATED: 330 K/UL (ref 150–450)

## 2019-01-18 PROCEDURE — 84146 ASSAY OF PROLACTIN: CPT

## 2019-01-18 PROCEDURE — 84443 ASSAY THYROID STIM HORMONE: CPT

## 2019-01-18 PROCEDURE — 85025 COMPLETE CBC W/AUTO DIFF WBC: CPT

## 2019-08-21 ENCOUNTER — HOSPITAL ENCOUNTER (OUTPATIENT)
Dept: HOSPITAL 89 - CT | Age: 24
End: 2019-08-21
Attending: OBSTETRICS & GYNECOLOGY
Payer: COMMERCIAL

## 2019-08-21 DIAGNOSIS — R41.3: ICD-10-CM

## 2019-08-21 DIAGNOSIS — R51: Primary | ICD-10-CM

## 2019-08-21 DIAGNOSIS — R41.0: ICD-10-CM

## 2019-08-21 DIAGNOSIS — H53.9: ICD-10-CM

## 2019-08-21 DIAGNOSIS — R47.81: ICD-10-CM

## 2019-08-21 PROCEDURE — 70470 CT HEAD/BRAIN W/O & W/DYE: CPT
